# Patient Record
Sex: FEMALE | Race: WHITE
[De-identification: names, ages, dates, MRNs, and addresses within clinical notes are randomized per-mention and may not be internally consistent; named-entity substitution may affect disease eponyms.]

---

## 2018-07-13 ENCOUNTER — HOSPITAL ENCOUNTER (EMERGENCY)
Dept: HOSPITAL 41 - JD.ED | Age: 53
Discharge: HOME | End: 2018-07-13
Payer: COMMERCIAL

## 2018-07-13 DIAGNOSIS — R07.89: Primary | ICD-10-CM

## 2018-07-13 PROCEDURE — 85379 FIBRIN DEGRADATION QUANT: CPT

## 2018-07-13 PROCEDURE — 80053 COMPREHEN METABOLIC PANEL: CPT

## 2018-07-13 PROCEDURE — 85025 COMPLETE CBC W/AUTO DIFF WBC: CPT

## 2018-07-13 PROCEDURE — 93005 ELECTROCARDIOGRAM TRACING: CPT

## 2018-07-13 PROCEDURE — 84484 ASSAY OF TROPONIN QUANT: CPT

## 2018-07-13 PROCEDURE — 71046 X-RAY EXAM CHEST 2 VIEWS: CPT

## 2018-07-13 PROCEDURE — 99285 EMERGENCY DEPT VISIT HI MDM: CPT

## 2018-07-13 PROCEDURE — 36415 COLL VENOUS BLD VENIPUNCTURE: CPT

## 2018-07-13 NOTE — CR
Chest: Two views of the chest were obtained.

 

Comparison: No prior chest x-ray.

 

Heart size and mediastinum are normal.  Lungs are clear.  Bony 

structures appear within normal limits for the patient's age.

 

Impression:

1.  Nothing acute is seen on two-view chest x-ray.

 

Diagnostic code #1

## 2018-07-13 NOTE — EDM.PDOC
ED HPI GENERAL MEDICAL PROBLEM





- General


Chief Complaint: Chest Pain


Stated Complaint: CHEST PAIN


Time Seen by Provider: 07/13/18 08:39


Source of Information: Reports: Patient


History Limitations: Reports: No Limitations





- History of Present Illness


INITIAL COMMENTS - FREE TEXT/NARRATIVE: 





The patient presents with chest pain.  This started yesterday.  She has been 

dealing with allergies.  She saw an expert in [place] and they are trying to 

find out the cause.  She has been having runny nose, sneezing, cough and 

shortness of breath.  She had an episode yesterday and after that she developed 

chest tightness.  It is constant.  She has some shortness of breath with it.  

She has never had an MI before.  She has no history of heart disease, 

hypertension or diabetes.  She does have a history of hypercholesterolemia.  

She was taken off of her meds because of myalgias.  They will recheck her labs 

later.  She has some tightness now.  She has no fever, chills, cough, abdominal 

pain, nausea or vomiting.  She does not smoke.


Onset: Gradual


Duration: Day(s): (2)


Location: Reports: Chest


Quality: Reports: Other (Tightness)


Severity: Mild


Improves with: Reports: None


Worsens with: Reports: None


Associated Symptoms: Reports: Chest Pain, Cough, Shortness of Breath.  Denies: 

Fever/Chills, Headaches, Nausea/Vomiting


  ** Chest


Pain Score (Numeric/FACES): 4





- Related Data


 Allergies











Allergy/AdvReac Type Severity Reaction Status Date / Time


 


animal dander Allergy  Swollen Verified 07/13/18 09:11





   Eyes  


 


Sulfa (Sulfonamide Allergy  Hives Verified 07/13/18 09:07





Antibiotics)     


 


perservatives Allergy  Sneezing Uncoded 07/13/18 09:11











Home Meds: 


 Home Meds





. [No Known Home Meds]  07/13/18 [History]











Past Medical History


HEENT History: Reports: Other (See Below)


Other HEENT History: allergies


Cardiovascular History: Reports: High Cholesterol


Genitourinary History: Reports: UTI, Recurrent


Other Genitourinary History: bladder and kidney/renal cancer.


OB/GYN History: Reports: Pregnancy


Hematologic History: Reports: Anemia


Oncologic (Cancer) History: Reports: Basal Cell Carcinoma, Bladder, Renal, 

Other (See Below)


Other Oncologic History: R) kidney removed due to cancer.  L) adrenal gland 

removed to due non-cancerous tumor.


Dermatologic History: Reports: Other (See Below)


Other Dermatologic History: basal cell carcinoma.





- Infectious Disease History


Infectious Disease History: Reports: Chicken Pox, Shingles





- Past Surgical History


HEENT Surgical History: Reports: Tonsillectomy, Other (See Below)


Other HEENT Surgeries/Procedures: wisdom teeth removal.


Female  Surgical History: Reports: Hysterectomy


Endocrine Surgical History: Reports: Adrenal Gland


Other Endocrine Surgeries/Procedures: L) removed due to non-cancerous tumor.


Musculoskeletal Surgical History: Reports: Arthroscopic Knee


Other Musculoskeletal Surgeries/Procedures:: R) knee.





Social & Family History





- Tobacco Use


Smoking Status *Q: Never Smoker


Second Hand Smoke Exposure: No





- Caffeine Use


Caffeine Use: Reports: Coffee, Soda





- Recreational Drug Use


Recreational Drug Use: No





ED ROS GENERAL





- Review of Systems


Review Of Systems: See Below


Constitutional: Reports: No Symptoms


HEENT: Reports: Other (Congestion and runny nose)


Respiratory: Reports: Shortness of Breath, Cough


Cardiovascular: Reports: Chest Pain


Endocrine: Reports: No Symptoms


GI/Abdominal: Reports: No Symptoms


: Reports: No Symptoms


Musculoskeletal: Reports: No Symptoms





ED EXAM, GENERAL





- Physical Exam


Exam: See Below


Exam Limited By: No Limitations


General Appearance: Alert, No Apparent Distress


Ears: Normal External Exam


Nose: Normal Inspection


Head: Atraumatic, Normocephalic


Neck: Normal Inspection


Respiratory/Chest: No Respiratory Distress, Lungs Clear, Normal Breath Sounds


Cardiovascular: Regular Rate, Rhythm, No Edema, No Murmur


GI/Abdominal: Soft, Non-Tender, No Organomegaly, No Mass


Back Exam: Normal Inspection


Extremities: Normal Inspection


Neurological: Alert, Oriented, No Motor/Sensory Deficits





EKG INTERPRETATION


EKG Date: 07/13/18


Time: 08:37


Rhythm: NSR


Rate (Beats/Min): 73


Axis: Normal


P-Wave: Present


QRS: Normal


ST-T: Normal


QT: Normal





Course





- Vital Signs


Last Recorded V/S: 


 Last Vital Signs











Temp  97.5 F   07/13/18 08:35


 


Pulse  71   07/13/18 08:35


 


Resp  16   07/13/18 08:35


 


BP  144/82 H  07/13/18 08:35


 


Pulse Ox  96   07/13/18 08:35














- Orders/Labs/Meds


Orders: 


 Active Orders 24 hr











 Category Date Time Status


 


 Cardiac Monitoring [RC] .AS DIRECTED Care  07/13/18 08:45 Active


 


 EKG Documentation Completion [RC] STAT Care  07/13/18 08:45 Active


 


 Peripheral IV Care [RC] .AS DIRECTED Care  07/13/18 08:45 Active


 


 Sodium Chloride 0.9% [Saline Flush] Med  07/13/18 08:45 Active





 10 ml FLUSH ASDIRECTED PRN   


 


 Peripheral IV Insertion Adult [OM.PC] Stat Oth  07/13/18 08:45 Ordered








 Medication Orders





Sodium Chloride (Saline Flush)  10 ml FLUSH ASDIRECTED PRN


   PRN Reason: Keep Vein Open


   Last Admin: 07/13/18 08:55  Dose: 10 ml








Labs: 


 Laboratory Tests











  07/13/18 07/13/18 07/13/18 Range/Units





  08:55 08:55 08:55 


 


WBC  7.64    (3.98-10.04)  K/mm3


 


RBC  5.05    (3.98-5.22)  M/mm3


 


Hgb  14.2    (11.2-15.7)  gm/L


 


Hct  43.1    (34.1-44.9)  %


 


MCV  85.3    (79.4-94.8)  fl


 


MCH  28.1    (25.6-32.2)  pg


 


MCHC  32.9    (32.2-35.5)  g/dl


 


RDW Std Deviation  40.4    (36.4-46.3)  fL


 


Plt Count  318    (182-369)  K/mm3


 


MPV  10.1    (9.4-12.3)  fl


 


Neut % (Auto)  52.9    (34.0-71.1)  %


 


Lymph % (Auto)  35.5    (19.3-51.7)  %


 


Mono % (Auto)  9.4    (4.7-12.5)  %


 


Eos % (Auto)  1.2    (0.7-5.8)  


 


Baso % (Auto)  0.7    (0.1-1.2)  %


 


Neut # (Auto)  4.05    (1.56-6.13)  K/mm3


 


Lymph # (Auto)  2.71    (1.18-3.74)  K/mm3


 


Mono # (Auto)  0.72 H    (0.24-0.36)  K/mm3


 


Eos # (Auto)  0.09    (0.04-0.36)  K/mm3


 


Baso # (Auto)  0.05    (0.01-0.08)  K/mm3


 


D-Dimer, Quantitative   0.20   (0.19-0.50)  mg/L


 


Sodium    140  (136-145)  mEq/L


 


Potassium    4.2  (3.5-5.1)  mEq/L


 


Chloride    105  ()  mEq/L


 


Carbon Dioxide    27  (21-32)  mEq/L


 


Anion Gap    12.2  (5-15)  


 


BUN    15  (7-18)  mg/dL


 


Creatinine    1.0  (0.55-1.02)  mg/dL


 


Est Cr Clr Drug Dosing    71.05  mL/min


 


Estimated GFR (MDRD)    58  (>60)  mL/min


 


BUN/Creatinine Ratio    15.0  (14-18)  


 


Glucose    95  ()  mg/dL


 


Calcium    9.5  (8.5-10.1)  mg/dL


 


Total Bilirubin    0.4  (0.2-1.0)  mg/dL


 


AST    42 H  (15-37)  U/L


 


ALT    105 H  (14-59)  U/L


 


Alkaline Phosphatase    101  ()  U/L


 


Troponin I    < 0.017  (0.00-0.056)  ng/mL


 


Total Protein    7.7  (6.4-8.2)  g/dl


 


Albumin    4.1  (3.4-5.0)  g/dl


 


Globulin    3.6  gm/dL


 


Albumin/Globulin Ratio    1.1  (1-2)  











Meds: 


Medications











Generic Name Dose Route Start Last Admin





  Trade Name Freq  PRN Reason Stop Dose Admin


 


Sodium Chloride  10 ml  07/13/18 08:45  07/13/18 08:55





  Saline Flush  FLUSH   10 ml





  ASDIRECTED PRN   Administration





  Keep Vein Open   





     





     





     














Discontinued Medications














Generic Name Dose Route Start Last Admin





  Trade Name Freq  PRN Reason Stop Dose Admin


 


Aspirin  324 mg  07/13/18 08:45  07/13/18 09:01





  Aspirin  PO  07/13/18 08:46  324 mg





  ONETIME ONE   Administration





     





     





     





     














- Re-Assessments/Exams


Free Text/Narrative Re-Assessment/Exam: 





07/13/18 10:06


I ordered an IV saline lock, EKG, CXR, labs and aspirin.  Her EKG shows a NSR 

with no acute changes.  Her CXR looks good


07/13/18 10:16


Her CBC and CMP look good.  Her troponin and D-dimer were negative.  I feel 

this is not cardiac and may be related to her allergies.





Departure





- Departure


Time of Disposition: 10:25


Disposition: Home, Self-Care 01


Condition: Good


Clinical Impression: 


 Atypical chest pain





Referrals: 


Aston Cobb MD [Primary Care Provider] - 


Forms:  ED Department Discharge


Additional Instructions: 


Take motrin or tylenol for pain.  Follow up with your doctor in 1 week please 

return if you are worse.





- My Orders


Last 24 Hours: 


My Active Orders





07/13/18 08:45


Cardiac Monitoring [RC] .AS DIRECTED 


EKG Documentation Completion [RC] STAT 


Peripheral IV Care [RC] .AS DIRECTED 


Sodium Chloride 0.9% [Saline Flush]   10 ml FLUSH ASDIRECTED PRN 


Peripheral IV Insertion Adult [OM.PC] Stat 














- Assessment/Plan


Last 24 Hours: 


My Active Orders





07/13/18 08:45


Cardiac Monitoring [RC] .AS DIRECTED 


EKG Documentation Completion [RC] STAT 


Peripheral IV Care [RC] .AS DIRECTED 


Sodium Chloride 0.9% [Saline Flush]   10 ml FLUSH ASDIRECTED PRN 


Peripheral IV Insertion Adult [OM.PC] Stat

## 2021-05-25 NOTE — CR
Chest: Portable view of the chest was obtained.

 

Comparison: Prior chest x-ray of 07/13/18.

 

Heart size and mediastinum are normal.  Lungs are clear with no acute 

parenchymal change.  No acute osseous abnormality is appreciated.

 

Impression:

1.  Nothing acute is seen on portable chest x-ray.

 

Diagnostic code #1

## 2021-05-25 NOTE — EDM.PDOC
ED HPI GENERAL MEDICAL PROBLEM





- General


Chief Complaint: Respiratory Problem


Stated Complaint: COVID +/COUGH AND HEADACHE


Time Seen by Provider: 05/25/21 13:12


Source of Information: Reports: Patient, RN Notes Reviewed


History Limitations: Reports: No Limitations





- History of Present Illness


INITIAL COMMENTS - FREE TEXT/NARRATIVE: 





Patient is a 56-year-old female presenting to the emergency department for 

evaluation with regards to positive COVID-19 test.  She reports that on 

Wednesday of last week she began to feel mildly achy with a little bit of a 

headache.  She did get her second Covid vaccine on Thursday.  Over the weekend, 

symptoms have been progressively worsening.  She complains of headache, body 

aches, nausea, cough, fatigue, and chills.  Denies any significant shortness of 

breath.  She had known exposure to an individual who tested positive for Covid 

today.  She was then tested by the Department of Health today and found to be 

positive.  She presents to the ER with request of receiving monoclonal 

antibodies.  She does have a history significant for hyperlipidemia as well as 

her BMI is greater than 35.





Vital signs on triage were found to be normal.  Temperature 99.1 temporal, pulse

72, blood pressure 136/69, respiratory rate 20, oxygen 96% on room air.


Treatments PTA: Reports: Other (see below)


Other Treatments PTA: none


  ** Headache


Pain Score (Numeric/FACES): 3





  ** Generalized


Pain Score (Numeric/FACES): 4





- Related Data


                                    Allergies











Allergy/AdvReac Type Severity Reaction Status Date / Time


 


animal dander Allergy  Swollen Verified 07/13/18 09:11





   Eyes  


 


Sulfa (Sulfonamide Allergy  Hives Verified 07/13/18 09:07





Antibiotics)     


 


perservatives Allergy  Sneezing Uncoded 07/13/18 09:11











Home Meds: 


                                    Home Meds





Simvastatin 20 mg PO DAILY 05/25/21 [History]











Past Medical History


HEENT History: Reports: Other (See Below)


Other HEENT History: allergies


Cardiovascular History: Reports: High Cholesterol


Genitourinary History: Reports: UTI, Recurrent


Other Genitourinary History: bladder and kidney/renal cancer.


OB/GYN History: Reports: Pregnancy


Hematologic History: Reports: Anemia


Oncologic (Cancer) History: Reports: Basal Cell Carcinoma, Bladder, Renal, Other

 (See Below)


Other Oncologic History: R) kidney removed due to cancer.  L) adrenal gland 

removed to due non-cancerous tumor.


Dermatologic History: Reports: Other (See Below)


Other Dermatologic History: basal cell carcinoma.





- Infectious Disease History


Infectious Disease History: Reports: Chicken Pox, Shingles





- Past Surgical History


HEENT Surgical History: Reports: Tonsillectomy, Other (See Below)


Other HEENT Surgeries/Procedures: wisdom teeth removal.


Female  Surgical History: Reports: Hysterectomy


Endocrine Surgical History: Reports: Adrenal Gland


Other Endocrine Surgeries/Procedures: L) removed due to non-cancerous tumor.


Musculoskeletal Surgical History: Reports: Arthroscopic Knee


Other Musculoskeletal Surgeries/Procedures:: R) knee.





Social & Family History





- Tobacco Use


Tobacco Use Status *Q: Never Tobacco User





- Caffeine Use


Caffeine Use: Reports: Coffee, Soda, Tea





- Recreational Drug Use


Recreational Drug Use: No





ED ROS GENERAL





- Review of Systems


Review Of Systems: See Below


Constitutional: Reports: Fever, Chills, Fatigue, Decreased Appetite


HEENT: Reports: No Symptoms


Respiratory: Reports: Cough.  Denies: Shortness of Breath, Pleuritic Chest Pain


Cardiovascular: Reports: No Symptoms


Endocrine: Reports: No Symptoms


GI/Abdominal: Reports: Nausea.  Denies: Abdominal Pain, Diarrhea, Vomiting


: Reports: No Symptoms


Musculoskeletal: Reports: Other (Generalized body aches)


Skin: Reports: No Symptoms


Neurological: Reports: Headache


Psychiatric: Reports: No Symptoms


Hematologic/Lymphatic: Reports: No Symptoms


Immunologic: Reports: No Symptoms





ED EXAM, GENERAL





- Physical Exam


Exam: See Below


Exam Limited By: No Limitations


General Appearance: Alert, WD/WN, No Apparent Distress


Respiratory/Chest: No Respiratory Distress, Lungs Clear, Normal Breath Sounds, 

No Accessory Muscle Use, Chest Non-Tender


Cardiovascular: Normal Peripheral Pulses, Regular Rate, Rhythm, No Edema, No 

Gallop, No JVD, No Murmur, No Rub


GI/Abdominal: Normal Bowel Sounds, Soft, Non-Tender, No Organomegaly, No 

Distention, No Abnormal Bruit, No Mass


Neurological: Alert, Oriented, CN II-XII Intact, Normal Cognition, Normal Gait, 

Normal Reflexes, No Motor/Sensory Deficits


Psychiatric: Normal Affect, Normal Mood


Skin Exam: Warm, Dry, Intact, Normal Color, No Rash





Course





- Vital Signs


Last Recorded V/S: 


                                Last Vital Signs











Temp  98.9 F   05/25/21 16:45


 


Pulse  71   05/25/21 16:45


 


Resp  20   05/25/21 16:45


 


BP  112/85   05/25/21 16:45


 


Pulse Ox  96   05/25/21 16:45














- Orders/Labs/Meds


Labs: 


                                Laboratory Tests











  05/25/21 05/25/21 Range/Units





  14:19 14:19 


 


WBC  5.25   (3.98-10.04)  K/mm3


 


RBC  5.14   (3.98-5.22)  M/mm3


 


Hgb  14.3   (11.2-15.7)  gm/dl


 


Hct  44.2   (34.1-44.9)  %


 


MCV  86.0   (79.4-94.8)  fl


 


MCH  27.8   (25.6-32.2)  pg


 


MCHC  32.4   (32.2-35.5)  g/dl


 


RDW Std Deviation  44.0   (36.4-46.3)  fL


 


Plt Count  317   (182-369)  K/mm3


 


MPV  10.7   (9.4-12.3)  fl


 


Neut % (Auto)  36.5   (34.0-71.1)  %


 


Lymph % (Auto)  50.7   (19.3-51.7)  %


 


Mono % (Auto)  10.1   (4.7-12.5)  %


 


Eos % (Auto)  1.7   (0.7-5.8)  


 


Baso % (Auto)  0.6   (0.1-1.2)  %


 


Neut # (Auto)  1.92   (1.56-6.13)  K/mm3


 


Lymph # (Auto)  2.66   (1.18-3.74)  K/mm3


 


Mono # (Auto)  0.53 H   (0.24-0.36)  K/mm3


 


Eos # (Auto)  0.09   (0.04-0.36)  K/mm3


 


Baso # (Auto)  0.03   (0.01-0.08)  K/mm3


 


Sodium   144  (136-145)  mEq/L


 


Potassium   4.3  (3.5-5.1)  mEq/L


 


Chloride   105  ()  mEq/L


 


Carbon Dioxide   26  (21-32)  mEq/L


 


Anion Gap   17.3 H  (5-15)  


 


BUN   17  (7-18)  mg/dL


 


Creatinine   1.0  (0.55-1.02)  mg/dL


 


Est Cr Clr Drug Dosing   66.31  mL/min


 


Estimated GFR (MDRD)   57  (>60)  mL/min


 


BUN/Creatinine Ratio   17.0  (14-18)  


 


Glucose   92  (70-99)  mg/dL


 


Calcium   9.0  (8.5-10.1)  mg/dL


 


Total Bilirubin   0.3  (0.2-1.0)  mg/dL


 


AST   30  (15-37)  U/L


 


ALT   60 H  (14-59)  U/L


 


Alkaline Phosphatase   85  ()  U/L


 


Total Protein   8.0  (6.4-8.2)  g/dl


 


Albumin   4.2  (3.4-5.0)  g/dl


 


Globulin   3.8  gm/dL


 


Albumin/Globulin Ratio   1.1  (1-2)  











Meds: 


Medications














Discontinued Medications














Generic Name Dose Route Start Last Admin





  Trade Name Freq  PRN Reason Stop Dose Admin


 


Acetaminophen  650 mg  05/25/21 14:33  05/25/21 14:44





  Acetaminophen 325 Mg Tab  PO  05/25/21 14:34  650 mg





  NOW ONE   Administration


 


Diphenhydramine HCl  50 mg  05/25/21 13:46 





  Diphenhydramine 50 Mg/Ml Sdv  IVPUSH  





  ONETIME PRN  





  hypersensitivity reaction  


 


Epinephrine HCl  0.3 mg  05/25/21 13:46 





  Epinephrine 1 Mg/Ml Sdv  IM  





  ONETIME PRN  





  hypersensitivity reaction  


 


Famotidine  20 mg  05/25/21 13:46 





  Famotidine 20 Mg/2 Ml Sdv  IVPUSH  





  ONETIME PRN  





  hypersensitivity reaction  


 


Bamlanivimab 700 mg/  310 mls @ 310 mls/hr  05/25/21 14:45  05/25/21 15:00





Etesevimab 1,400 mg/ Sodium  IV  05/25/21 15:44  310 mls/hr





Chloride  ONETIME ONE   Administration


 


Methylprednisolone Sodium Succinate  125 mg  05/25/21 13:46 





  Methylprednisolone Sodium Succinate 125 Mg/2 Ml Sdv  IVPUSH  





  ONETIME PRN  





  hypersensitivity reaction  


 


Sodium Chloride  30 ml  05/25/21 14:00 





  Sodium Chloride 0.9% 10 Ml Syringe  FLUSH  





  ASDIRECTED BARRIE  














- Re-Assessments/Exams


Free Text/Narrative Re-Assessment/Exam: 


Patient is a 56-year-old female diagnosed Covid positive today presenting to the

 emergency department with request to receive monoclonal antibodies.  She did 

receive her second Covid vaccine on Thursday of last week.  Symptoms began 

mildly the day before she received her second Covid vaccine.  I did speak with 

[place] and pharmacy who stated that the patient can still receive monoclonal 

antibodies after receiving Covid vaccine.  Patient's exam is grossly 

unremarkable.  Lung sounds are clear.  Patient was educated with regards to 

monoclonal antibody treatment and she would like to proceed with this.  I have 

ordered basic blood work, chest x-ray, and Bamlanivimab with Etesevimab 

infusion.





05/25/21 15:34


Hematology is grossly unremarkable.  Nothing acute was seen on the chest xray.  

Monoclonal antibodies are infusing and pt is tolerating well thus far.


05/25/21 17:05


Patient has completed her monoclonal antibody infusion as well as the 1 hour 

waiting period after the infusion.  She tolerated it well.  She had no adverse 

reactions.  We will discharge her home.  Discharge instructions as documented.








Departure





- Departure


Time of Disposition: 17:05


Disposition: Home, Self-Care 01


Condition: Good


Clinical Impression: 


 COVID-19








- Discharge Information


*PRESCRIPTION DRUG MONITORING PROGRAM REVIEWED*: No


*COPY OF PRESCRIPTION DRUG MONITORING REPORT IN PATIENT SONIA: No


Instructions:  COVID-19 Frequently Asked Questions, COVID-19 Vaccine Information


Referrals: 


PCP,None [Primary Care Provider] - 


Forms:  ED Department Discharge


Additional Instructions: 


You were seen in the emergency department today for evaluation with regards to 

symptoms related to COVID-19.  Work-up included blood work and a chest x-ray.  

Results of your work-up were found to be normal.  There is no evidence of Covid 

pneumonia.  While in the ER, you received an infusion of monoclonal antibodies. 

Recommend that you go home and rest.  You may use Tylenol and ibuprofen as 

needed for discomfort.  Follow the directions of the Department of Health with 

regards to quarantine.  If you should experience any new or worsening symptoms, 

please not hesitate to return to the emergency department for reevaluation.





Sepsis Event Note (ED)





- Evaluation


Sepsis Screening Result: No Definite Risk





- Focused Exam


Vital Signs: 


                                   Vital Signs











  Temp Pulse Resp BP Pulse Ox


 


 05/25/21 16:45  98.9 F  71  20  112/85  96


 


 05/25/21 16:30  98.0 F  70  20  128/70  95


 


 05/25/21 16:15   70  18  108/95 H  95


 


 05/25/21 16:00   74  20  126/71  97


 


 05/25/21 15:45   72  18  125/85  94 L


 


 05/25/21 15:30   69  20  127/97 H  96


 


 05/25/21 15:15   69  20  125/70  95


 


 05/25/21 14:16  98.4 F  77  20  127/70  96


 


 05/25/21 13:26  99.1 F  72  20  136/69  96